# Patient Record
Sex: MALE | Race: OTHER | HISPANIC OR LATINO
[De-identification: names, ages, dates, MRNs, and addresses within clinical notes are randomized per-mention and may not be internally consistent; named-entity substitution may affect disease eponyms.]

---

## 2019-06-17 ENCOUNTER — RESULT REVIEW (OUTPATIENT)
Age: 49
End: 2019-06-17

## 2019-07-05 PROBLEM — Z00.00 ENCOUNTER FOR PREVENTIVE HEALTH EXAMINATION: Status: ACTIVE | Noted: 2019-07-05

## 2019-07-24 ENCOUNTER — APPOINTMENT (OUTPATIENT)
Dept: GASTROENTEROLOGY | Facility: CLINIC | Age: 49
End: 2019-07-24
Payer: COMMERCIAL

## 2019-07-24 VITALS
HEIGHT: 66 IN | SYSTOLIC BLOOD PRESSURE: 90 MMHG | DIASTOLIC BLOOD PRESSURE: 60 MMHG | WEIGHT: 120 LBS | BODY MASS INDEX: 19.29 KG/M2 | HEART RATE: 108 BPM

## 2019-07-24 DIAGNOSIS — F10.11 ALCOHOL ABUSE, IN REMISSION: ICD-10-CM

## 2019-07-24 DIAGNOSIS — M72.6 NECROTIZING FASCIITIS: ICD-10-CM

## 2019-07-24 PROCEDURE — 99214 OFFICE O/P EST MOD 30 MIN: CPT

## 2019-07-24 RX ORDER — LACTULOSE 10 G/15 ML
10 SOLUTION, ORAL ORAL
Refills: 0 | Status: ACTIVE | COMMUNITY

## 2019-07-24 RX ORDER — THIAMINE HCL 100 MG
100 TABLET ORAL
Refills: 0 | Status: ACTIVE | COMMUNITY

## 2019-07-24 NOTE — ASSESSMENT
[FreeTextEntry1] : 1. ETOH related cirrhosis. Last drink 6/2019. Agrees to abstinence. Will enroll in AA. EGD planned to exclude varices. Office follow up monthly\par \par 2. Gallstones: Expectant management for now

## 2019-07-24 NOTE — HISTORY OF PRESENT ILLNESS
[de-identified] : Patient presents for f/u after admission to [facility] ( 6/2019)  for ETOH related  hepatitis  ( was  on steroids for elevated Maddreys), cholecystitis  s/p  cholecystostomy tube, aspiration pneumonia. Re-admitted 7/16 - 7/23/2019 with  RLE necrotizing fascitis requiring fasciotomy. Cholecystostomy tube (  which had become dislodged ) was removed this past admission. Discharged on Xifaxan  / lactulose / pantoprazole / Aldactone. TB peaked at ~ 16 during initial admission. On 7/21/19 :  TB=5.3 with normal AST / ALT. Alk phos = 165

## 2019-07-24 NOTE — PHYSICAL EXAM
[General Appearance - Alert] : alert [General Appearance - In No Acute Distress] : in no acute distress [Neck Appearance] : the appearance of the neck was normal [Apical Impulse] : the apical impulse was normal [] : no respiratory distress [Abdomen Soft] : soft [Abnormal Walk] : normal gait [FreeTextEntry1] : jaundiced [Skin Color & Pigmentation] : normal skin color and pigmentation [No Focal Deficits] : no focal deficits [Oriented To Time, Place, And Person] : oriented to person, place, and time

## 2019-08-18 ENCOUNTER — RESULT REVIEW (OUTPATIENT)
Age: 49
End: 2019-08-18

## 2019-08-19 ENCOUNTER — RX CHANGE (OUTPATIENT)
Age: 49
End: 2019-08-19

## 2019-08-19 ENCOUNTER — APPOINTMENT (OUTPATIENT)
Dept: GASTROENTEROLOGY | Facility: HOSPITAL | Age: 49
End: 2019-08-19

## 2019-08-19 RX ORDER — PANTOPRAZOLE 40 MG/1
40 TABLET, DELAYED RELEASE ORAL
Qty: 30 | Refills: 3 | Status: ACTIVE | COMMUNITY
Start: 2019-08-19 | End: 1900-01-01

## 2019-08-19 RX ORDER — PROPRANOLOL HYDROCHLORIDE 10 MG/1
10 TABLET ORAL 3 TIMES DAILY
Qty: 90 | Refills: 6 | Status: ACTIVE | COMMUNITY
Start: 2019-08-19 | End: 1900-01-01

## 2019-08-28 ENCOUNTER — APPOINTMENT (OUTPATIENT)
Dept: GASTROENTEROLOGY | Facility: CLINIC | Age: 49
End: 2019-08-28
Payer: COMMERCIAL

## 2019-08-28 VITALS
HEART RATE: 68 BPM | WEIGHT: 128 LBS | DIASTOLIC BLOOD PRESSURE: 58 MMHG | SYSTOLIC BLOOD PRESSURE: 90 MMHG | HEIGHT: 66 IN | BODY MASS INDEX: 20.57 KG/M2

## 2019-08-28 PROCEDURE — 99214 OFFICE O/P EST MOD 30 MIN: CPT | Mod: 25

## 2019-08-28 PROCEDURE — 36415 COLL VENOUS BLD VENIPUNCTURE: CPT

## 2019-08-28 NOTE — PHYSICAL EXAM
[General Appearance - Alert] : alert [General Appearance - In No Acute Distress] : in no acute distress [Neck Appearance] : the appearance of the neck was normal [] : no respiratory distress [Apical Impulse] : the apical impulse was normal [Abdomen Soft] : soft [Abnormal Walk] : normal gait [Skin Color & Pigmentation] : normal skin color and pigmentation [No Focal Deficits] : no focal deficits [Oriented To Time, Place, And Person] : oriented to person, place, and time [FreeTextEntry1] : jaundiced

## 2019-08-28 NOTE — HISTORY OF PRESENT ILLNESS
[de-identified] : Patient presents for f/u after EGD earlier this month for h/o cirrhosis. EGD notable for +1 esophageal varices and gastric antral ulcer with overlying clot. Propranolol initiated /. pantoprazole continued.  Remain ETOH abstinent and attends AA meetings. Admitted  to [facility] ( 6/2019)  for ETOH related  hepatitis ( was  on steroids for elevated Maddreys), cholecystitis  s/p  cholecystostomy tube, aspiration pneumonia. Re-admitted 7/16 - 7/23/2019 with  RLE necrotizing fascitis requiring fasciotomy. Cholecystostomy tube (  which had become dislodged ) was removed this past admission. Discharged on Xifaxan  / lactulose / pantoprazole / Aldactone. TB peaked at ~ 16 during initial admission. On 7/21/19 :  TB=5.3 with normal AST / ALT. Alk phos = 165

## 2019-08-28 NOTE — CONSULT LETTER
[Dear  ___] : Dear  [unfilled], [Courtesy Letter:] : I had the pleasure of seeing your patient, [unfilled], in my office today. [Please see my note below.] : Please see my note below. [Consult Closing:] : Thank you very much for allowing me to participate in the care of this patient.  If you have any questions, please do not hesitate to contact me. [Sincerely,] : Sincerely, [FreeTextEntry3] : Agapito David MD\par tel: [phone]\par fax: [phone]\par

## 2019-08-28 NOTE — ASSESSMENT
[FreeTextEntry1] : 1. ETOH related cirrhosis. Last drink 6/2019. Agrees to abstinence. Enroll in AA. EGD with +1 varices / ulcer . Continue propranolol / pantoprazole. Labs drawn today\par \par 2. Gallstones: Expectant management for now\par \par \par Office follow up in  3 months

## 2019-08-29 LAB
ALBUMIN SERPL ELPH-MCNC: 2.9 G/DL
ALP BLD-CCNC: 163 U/L
ALT SERPL-CCNC: 25 U/L
ANION GAP SERPL CALC-SCNC: 11 MMOL/L
AST SERPL-CCNC: 54 U/L
BASOPHILS # BLD AUTO: 0.07 K/UL
BASOPHILS NFR BLD AUTO: 1.2 %
BILIRUB DIRECT SERPL-MCNC: 0.8 MG/DL
BILIRUB INDIRECT SERPL-MCNC: 0.6 MG/DL
BILIRUB SERPL-MCNC: 1.4 MG/DL
BUN SERPL-MCNC: 8 MG/DL
CALCIUM SERPL-MCNC: 8.9 MG/DL
CHLORIDE SERPL-SCNC: 106 MMOL/L
CO2 SERPL-SCNC: 22 MMOL/L
CREAT SERPL-MCNC: 0.55 MG/DL
EOSINOPHIL # BLD AUTO: 0.31 K/UL
EOSINOPHIL NFR BLD AUTO: 5.3 %
FOLATE SERPL-MCNC: >20 NG/ML
GLUCOSE SERPL-MCNC: 100 MG/DL
HCT VFR BLD CALC: 31.5 %
HGB BLD-MCNC: 10 G/DL
IMM GRANULOCYTES NFR BLD AUTO: 0.2 %
INR PPP: 1.19 RATIO
LYMPHOCYTES # BLD AUTO: 2.63 K/UL
LYMPHOCYTES NFR BLD AUTO: 44.9 %
MAN DIFF?: NORMAL
MCHC RBC-ENTMCNC: 31.7 GM/DL
MCHC RBC-ENTMCNC: 31.8 PG
MCV RBC AUTO: 100.3 FL
MONOCYTES # BLD AUTO: 0.61 K/UL
MONOCYTES NFR BLD AUTO: 10.4 %
NEUTROPHILS # BLD AUTO: 2.23 K/UL
NEUTROPHILS NFR BLD AUTO: 38 %
PLATELET # BLD AUTO: 117 K/UL
POTASSIUM SERPL-SCNC: 4.2 MMOL/L
PROT SERPL-MCNC: 6 G/DL
PT BLD: 13.8 SEC
RBC # BLD: 3.14 M/UL
RBC # FLD: 14.3 %
SODIUM SERPL-SCNC: 139 MMOL/L
VIT B12 SERPL-MCNC: 809 PG/ML
WBC # FLD AUTO: 5.86 K/UL

## 2019-11-04 ENCOUNTER — APPOINTMENT (OUTPATIENT)
Dept: GASTROENTEROLOGY | Facility: CLINIC | Age: 49
End: 2019-11-04
Payer: COMMERCIAL

## 2019-11-04 VITALS
HEART RATE: 70 BPM | SYSTOLIC BLOOD PRESSURE: 100 MMHG | HEIGHT: 66 IN | WEIGHT: 138 LBS | DIASTOLIC BLOOD PRESSURE: 64 MMHG | BODY MASS INDEX: 22.18 KG/M2

## 2019-11-04 PROCEDURE — 99214 OFFICE O/P EST MOD 30 MIN: CPT

## 2019-11-04 NOTE — ASSESSMENT
[FreeTextEntry1] : 1. ETOH related cirrhosis. Last drink 6/2019. Agrees to abstinence. Enrolled in AA. EGD with +1 varices / ulcer . Continue propranolol / pantoprazole. MELD = 8...does not need referral for liver transplant evaluation at present . Breast pain likely due to spironolactone.  Decrease dose to 25 mg daily...if persistent pain in  2-4 weeks, may d/c spironolactone\par \par 2. Gallstones: Expectant management for now\par \par \par Office follow up in  3 months

## 2019-11-04 NOTE — CONSULT LETTER
[Dear  ___] : Dear  [unfilled], [Courtesy Letter:] : I had the pleasure of seeing your patient, [unfilled], in my office today. [Please see my note below.] : Please see my note below. [Consult Closing:] : Thank you very much for allowing me to participate in the care of this patient.  If you have any questions, please do not hesitate to contact me. [Sincerely,] : Sincerely, [FreeTextEntry3] : Agapito David MD\par tel: [phone]\par fax: [phone]\par

## 2019-11-04 NOTE — HISTORY OF PRESENT ILLNESS
[de-identified] : Patient presents for f/u.  Continues with abstinence and AA meetings. Only complaint is breast pain.  Labs from 10/1/2019 (reviewed by me )   were notable for normal INR / creatinine = 0.62 / AST = 45 / ALT was normal / HGB= 11 / TB = 0.9 ( MELD = 8).   EGD 8/2019 for h/o cirrhosis notable for +1 esophageal varices and gastric antral ulcer with overlying clot. Propranolol  continued. At last visit had remained ETOH abstinent and was attending   AA meetings. Admitted  to [facility] ( 6/2019)  for ETOH related  hepatitis ( was  on steroids for elevated Maddreys), cholecystitis  s/p  cholecystostomy tube, aspiration pneumonia. Re-admitted 7/16 - 7/23/2019 with  RLE necrotizing fascitis requiring fasciotomy. Cholecystostomy tube (  which had become dislodged ) was removed this past admission. Discharged on Xifaxan  / lactulose / pantoprazole / Aldactone. TB peaked at ~ 16 during initial admission. On 7/21/19 :  TB=5.3 with normal AST / ALT. Alk phos = 165

## 2020-02-03 ENCOUNTER — APPOINTMENT (OUTPATIENT)
Dept: GASTROENTEROLOGY | Facility: CLINIC | Age: 50
End: 2020-02-03
Payer: COMMERCIAL

## 2020-02-03 VITALS
DIASTOLIC BLOOD PRESSURE: 68 MMHG | WEIGHT: 152 LBS | HEART RATE: 61 BPM | BODY MASS INDEX: 24.43 KG/M2 | SYSTOLIC BLOOD PRESSURE: 100 MMHG | HEIGHT: 66 IN

## 2020-02-03 PROCEDURE — 99215 OFFICE O/P EST HI 40 MIN: CPT

## 2020-02-03 NOTE — ASSESSMENT
[FreeTextEntry1] : 1. ETOH related cirrhosis. Last drink 6/2019. Agrees to abstinence. Enrolled in AA. EGD with +1 varices / ulcer . Continue propranolol / pantoprazole. MELD = 8...does not need referral for liver transplant evaluation at present . Breast pain likely due to spironolactone...D/C Aldactone  \par \par 2. Gallstones: Expectant management for now\par \par 3. Esophageal Varices:  Continue propranolol\par \par 4. H/O Hepatic encephalopathy:  Continue Lactulose\par \par 5. Colon cancer screening:  Colonoscopy scheduled\par \par \par Office follow up in  3 months

## 2020-02-03 NOTE — CONSULT LETTER
[Dear  ___] : Dear  [unfilled], [Courtesy Letter:] : I had the pleasure of seeing your patient, [unfilled], in my office today. [Please see my note below.] : Please see my note below. [Consult Closing:] : Thank you very much for allowing me to participate in the care of this patient.  If you have any questions, please do not hesitate to contact me. [Sincerely,] : Sincerely, [FreeTextEntry3] : Agapito David MD\par tel: [phone]\par fax: [phone]\par

## 2020-02-03 NOTE — HISTORY OF PRESENT ILLNESS
[de-identified] : Patient presents for f/u.  Labs from [facility] ( 11/2019 reviewed) . Bun =10 / Cr =.74 / Na = 138 / TB=0.6 / AST = 36 / ALT = 23  / PLTS = 148 / INR = 1.05. Continues with abstinence and AA meetings. Still with c/o breast pain despite decreased dose of Aldactone.   Labs from 10/1/2019 (reviewed by me )   were notable for normal INR / creatinine = 0.62 / AST = 45 / ALT was normal / HGB= 11 / TB = 0.9 ( MELD = 8).   EGD 8/2019 for h/o cirrhosis notable for +1 esophageal varices and gastric antral ulcer with overlying clot. Propranolol  continued. At last visit had remained ETOH abstinent and was attending   AA meetings. Admitted  to [facility] ( 6/2019)  for ETOH related  hepatitis ( was  on steroids for elevated Maddreys), cholecystitis  s/p  cholecystostomy tube, aspiration pneumonia. Re-admitted 7/16 - 7/23/2019 with  RLE necrotizing fascitis requiring fasciotomy. Cholecystostomy tube (  which had become dislodged ) was removed this past admission. Discharged on Xifaxan  / lactulose / pantoprazole / Aldactone. TB peaked at ~ 16 during initial admission. On 7/21/19 :  TB=5.3 with normal AST / ALT. Alk phos = 165

## 2020-03-17 ENCOUNTER — APPOINTMENT (OUTPATIENT)
Dept: GASTROENTEROLOGY | Facility: HOSPITAL | Age: 50
End: 2020-03-17

## 2020-08-10 ENCOUNTER — APPOINTMENT (OUTPATIENT)
Dept: GASTROENTEROLOGY | Facility: CLINIC | Age: 50
End: 2020-08-10
Payer: COMMERCIAL

## 2020-08-10 VITALS
WEIGHT: 170 LBS | HEART RATE: 66 BPM | SYSTOLIC BLOOD PRESSURE: 96 MMHG | TEMPERATURE: 98.5 F | BODY MASS INDEX: 27.32 KG/M2 | HEIGHT: 66 IN | DIASTOLIC BLOOD PRESSURE: 58 MMHG

## 2020-08-10 PROCEDURE — 99214 OFFICE O/P EST MOD 30 MIN: CPT | Mod: 25

## 2020-08-10 PROCEDURE — 36415 COLL VENOUS BLD VENIPUNCTURE: CPT

## 2020-08-10 RX ORDER — CHLORHEXIDINE GLUCONATE 4 %
400 LIQUID (ML) TOPICAL
Refills: 0 | Status: DISCONTINUED | COMMUNITY
End: 2020-08-10

## 2020-08-10 RX ORDER — NUT.TX.IMPAIRED DIGESTIVE FXN 0.035-1/ML
LIQUID (ML) ORAL
Refills: 0 | Status: DISCONTINUED | COMMUNITY
End: 2020-08-10

## 2020-08-10 RX ORDER — LEVOFLOXACIN 250 MG/1
250 TABLET, FILM COATED ORAL
Refills: 0 | Status: DISCONTINUED | COMMUNITY
End: 2020-08-10

## 2020-08-10 RX ORDER — SPIRONOLACTONE 25 MG/1
25 TABLET, FILM COATED ORAL
Refills: 0 | Status: DISCONTINUED | COMMUNITY
End: 2020-08-10

## 2020-08-10 RX ORDER — OMEGA-3/DHA/EPA/FISH OIL 300-1000MG
400 CAPSULE ORAL
Refills: 0 | Status: DISCONTINUED | COMMUNITY
End: 2020-08-10

## 2020-08-10 RX ORDER — FOLIC ACID 1 MG
1 TABLET ORAL
Refills: 0 | Status: DISCONTINUED | COMMUNITY
End: 2020-08-10

## 2020-08-10 NOTE — PHYSICAL EXAM
[General Appearance - Alert] : alert [Neck Appearance] : the appearance of the neck was normal [General Appearance - In No Acute Distress] : in no acute distress [Abdomen Soft] : soft [Apical Impulse] : the apical impulse was normal [] : no respiratory distress [Skin Color & Pigmentation] : normal skin color and pigmentation [Abnormal Walk] : normal gait [No Focal Deficits] : no focal deficits [Oriented To Time, Place, And Person] : oriented to person, place, and time [FreeTextEntry1] : jaundiced

## 2020-08-10 NOTE — HISTORY OF PRESENT ILLNESS
[de-identified] : Patient presents for f/u.  Last seen in office 2/2020. Colonoscopy 3/2020 : hemorrhoids and diverticulosis. Labs from [facility] ( 11/2019 reviewed) . Bun =10 / Cr =.74 / Na = 138 / TB=0.6 / AST = 36 / ALT = 23  / PLTS = 148 / INR = 1.05. Continues with abstinence and AA meetings. Still with c/o breast pain despite decreased dose of Aldactone.   Labs from 10/1/2019 (reviewed by me )   were notable for normal INR / creatinine = 0.62 / AST = 45 / ALT was normal / HGB= 11 / TB = 0.9 ( MELD = 8).   EGD 8/2019 for h/o cirrhosis notable for +1 esophageal varices and gastric antral ulcer with overlying clot. Propranolol  continued. At last visit had remained ETOH abstinent and was attending   AA meetings. Admitted  to [facility] ( 6/2019)  for ETOH related  hepatitis ( was  on steroids for elevated Maddreys), cholecystitis  s/p  cholecystostomy tube, aspiration pneumonia. Re-admitted 7/16 - 7/23/2019 with  RLE necrotizing fascitis requiring fasciotomy. Cholecystostomy tube (  which had become dislodged ) was removed this past admission. Discharged on Xifaxan  / lactulose / pantoprazole / Aldactone. TB peaked at ~ 16 during initial admission. On 7/21/19 :  TB=5.3 with normal AST / ALT. Alk phos = 165

## 2020-08-10 NOTE — ASSESSMENT
[FreeTextEntry1] : 1. ETOH related cirrhosis. Last drink 6/2019. Agrees to abstinence. Enrolled in AA. EGD with +1 varices / ulcer . Continue propranolol / pantoprazole. Last MELD = 8 ...does not need referral for liver transplant evaluation at present .  Aldactone  discontinued secondary to breast pain. Consider  d/c lactulose  at next visit. Sonogram next visit\par \par 2. Gallstones: Expectant management for now\par \par 3. Esophageal Varices:  Continue propranolol\par \par 4. H/O Hepatic encephalopathy:  Continue Lactulose\par \par 5. Colon cancer screening:  Colonoscopy normal 3/2020\par \par \par Office follow up in  3 months

## 2021-02-10 ENCOUNTER — APPOINTMENT (OUTPATIENT)
Dept: GASTROENTEROLOGY | Facility: CLINIC | Age: 51
End: 2021-02-10
Payer: COMMERCIAL

## 2021-02-10 VITALS
BODY MASS INDEX: 28.12 KG/M2 | WEIGHT: 175 LBS | SYSTOLIC BLOOD PRESSURE: 100 MMHG | OXYGEN SATURATION: 96 % | DIASTOLIC BLOOD PRESSURE: 70 MMHG | HEART RATE: 56 BPM | TEMPERATURE: 96.7 F | HEIGHT: 66 IN

## 2021-02-10 DIAGNOSIS — K80.20 CALCULUS OF GALLBLADDER W/OUT CHOLECYSTITIS W/OUT OBSTRUCTION: ICD-10-CM

## 2021-02-10 DIAGNOSIS — K25.9 GASTRIC ULCER, UNSPECIFIED AS ACUTE OR CHRONIC, W/OUT HEMORRHAGE OR PERFORATION: ICD-10-CM

## 2021-02-10 DIAGNOSIS — Z12.11 ENCOUNTER FOR SCREENING FOR MALIGNANT NEOPLASM OF COLON: ICD-10-CM

## 2021-02-10 PROCEDURE — 36415 COLL VENOUS BLD VENIPUNCTURE: CPT

## 2021-02-10 PROCEDURE — 99072 ADDL SUPL MATRL&STAF TM PHE: CPT

## 2021-02-10 PROCEDURE — 99214 OFFICE O/P EST MOD 30 MIN: CPT | Mod: 25

## 2021-02-10 NOTE — PHYSICAL EXAM
[General Appearance - Alert] : alert [General Appearance - In No Acute Distress] : in no acute distress [] : no respiratory distress [Neck Appearance] : the appearance of the neck was normal [Apical Impulse] : the apical impulse was normal [Abdomen Soft] : soft [Skin Color & Pigmentation] : normal skin color and pigmentation [Abnormal Walk] : normal gait [No Focal Deficits] : no focal deficits [Oriented To Time, Place, And Person] : oriented to person, place, and time [FreeTextEntry1] : jaundiced

## 2021-02-10 NOTE — ASSESSMENT
[FreeTextEntry1] : 1. ETOH related cirrhosis. Last drink 6/2019. Agrees to abstinence. Enrolled in AA. EGD with +1 varices / ulcer . Continue propranolol / pantoprazole. Last MELD = 8 ...does not need referral for liver transplant evaluation at present .  Aldactone  discontinued secondary to breast pain.  Sonogram ordered. Labs ordered\par \par 2. Gallstones: Expectant management for now\par \par 3. Esophageal Varices:  Continue propranolol\par \par 4. H/O Hepatic encephalopathy:  Continue Lactulose\par \par 5. Colon cancer screening:  Colonoscopy normal in 3/2020\par \par \par Office follow up in  6 months \par \par The ordered labs/ bloods  were drawn / collected in my office\par

## 2021-02-10 NOTE — HISTORY OF PRESENT ILLNESS
[de-identified] : Patient presents for f/u.  Remains  abstinent / no new complaints.\par Last seen in office 8/2020 at which point he was ETOH abstinent  / enrolled in AA. \par Colonoscopy 3/2020 : hemorrhoids and diverticulosis.\par Labs from [facility] ( 11/2019 reviewed) . Bun =10 / Cr =.74 / Na = 138 / TB=0.6 / AST = 36 / ALT = 23  / PLTS = 148 / INR = 1.05. Continues with abstinence and AA meetings. Still with c/o breast pain despite decreased dose of Aldactone.   Labs from 10/1/2019 (reviewed by me )   were notable for normal INR / creatinine = 0.62 / AST = 45 / ALT was normal / HGB= 11 / TB = 0.9 ( MELD = 8).   EGD 8/2019 for h/o cirrhosis notable for +1 esophageal varices and gastric antral ulcer with overlying clot. Propranolol  continued. At last visit had remained ETOH abstinent and was attending   AA meetings. Admitted  to [facility] ( 6/2019)  for ETOH related  hepatitis ( was  on steroids for elevated Maddreys), cholecystitis  s/p  cholecystostomy tube, aspiration pneumonia. Re-admitted 7/16 - 7/23/2019 with  RLE necrotizing fascitis requiring fasciotomy. Cholecystostomy tube (  which had become dislodged ) was removed this past admission. Discharged on Xifaxan  / lactulose / pantoprazole / Aldactone. TB peaked at ~ 16 during initial admission. On 7/21/19 :  TB=5.3 with normal AST / ALT. Alk phos = 165

## 2021-02-16 ENCOUNTER — RESULT REVIEW (OUTPATIENT)
Age: 51
End: 2021-02-16

## 2021-08-11 ENCOUNTER — APPOINTMENT (OUTPATIENT)
Dept: GASTROENTEROLOGY | Facility: CLINIC | Age: 51
End: 2021-08-11
Payer: COMMERCIAL

## 2021-08-11 VITALS
TEMPERATURE: 98.7 F | BODY MASS INDEX: 28.12 KG/M2 | SYSTOLIC BLOOD PRESSURE: 100 MMHG | OXYGEN SATURATION: 97 % | WEIGHT: 175 LBS | HEIGHT: 66 IN | HEART RATE: 62 BPM | DIASTOLIC BLOOD PRESSURE: 60 MMHG

## 2021-08-11 PROCEDURE — 99214 OFFICE O/P EST MOD 30 MIN: CPT

## 2021-08-11 PROCEDURE — 99072 ADDL SUPL MATRL&STAF TM PHE: CPT

## 2021-08-11 NOTE — HISTORY OF PRESENT ILLNESS
[de-identified] : Patient presents for f/u.  Remains  abstinent / no new complaints. \par \par Labs  from 3/2021 were reviewed and  were notable  for normal LFTS / normal CBSLast evaluated 2/2021 and  8/2020 at which point he was ETOH abstinent  / enrolled in AA. \par \par Colonoscopy 3/2020 : hemorrhoids and diverticulosis.\par \par Labs from [facility] ( 11/2019 reviewed) . Bun =10 / Cr =.74 / Na = 138 / TB=0.6 / AST = 36 / ALT = 23  / PLTS = 148 / INR = 1.05. Continues with abstinence and AA meetings.  Labs from 10/1/2019 (reviewed by me )   were notable for normal INR / creatinine = 0.62 / AST = 45 / ALT was normal / HGB= 11 / TB = 0.9 ( MELD = 8).  \par \par - EGD 8/2019 for h/o cirrhosis notable for +1 esophageal varices and gastric antral ulcer with overlying clot. Propranolol  continued. \par \par Admitted  to [facility] ( 6/2019)  for ETOH related  hepatitis ( was  on steroids for elevated Maddreys), cholecystitis  s/p  cholecystostomy tube, aspiration pneumonia. Re-admitted 7/16 - 7/23/2019 with  RLE necrotizing fascitis requiring fasciotomy. Discharged on Xifaxan  / lactulose / pantoprazole / Aldactone. TB peaked at ~ 16 during initial admission. On 7/21/19 :  TB=5.3 with normal AST / ALT. Alk phos = 165

## 2021-08-11 NOTE — ASSESSMENT
[FreeTextEntry1] : 1. ETOH related cirrhosis. Last drink 6/2019. Agrees to abstinence. Enrolled in AA. EGD with +1 varices / ulcer . Continue propranolol / pantoprazole. Last MELD = 8 ...does not need referral for liver transplant evaluation at present .  Aldactone  discontinued secondary to breast pain.  Follow up in 1  year for Labs / sonogram. \par \par 2. Gallstones: Expectant management for now\par \par 3. Esophageal Varices:  Continue propranolol\par \par 4. Colon cancer screening:  Colonoscopy normal in 3/2020\par \par \par Office follow up in  1 year \par D/C  lactulose\par \par

## 2022-09-12 ENCOUNTER — APPOINTMENT (OUTPATIENT)
Dept: GASTROENTEROLOGY | Facility: CLINIC | Age: 52
End: 2022-09-12

## 2022-09-12 VITALS
WEIGHT: 175 LBS | OXYGEN SATURATION: 97 % | DIASTOLIC BLOOD PRESSURE: 67 MMHG | HEART RATE: 64 BPM | SYSTOLIC BLOOD PRESSURE: 110 MMHG | TEMPERATURE: 98.9 F | HEIGHT: 66 IN | BODY MASS INDEX: 28.12 KG/M2

## 2022-09-12 DIAGNOSIS — K70.30 ALCOHOLIC CIRRHOSIS OF LIVER W/OUT ASCITES: ICD-10-CM

## 2022-09-12 DIAGNOSIS — I85.00 ESOPHAGEAL VARICES W/OUT BLEEDING: ICD-10-CM

## 2022-09-12 PROCEDURE — 36415 COLL VENOUS BLD VENIPUNCTURE: CPT

## 2022-09-12 PROCEDURE — 99214 OFFICE O/P EST MOD 30 MIN: CPT | Mod: 25

## 2022-09-12 NOTE — HISTORY OF PRESENT ILLNESS
[de-identified] : Patient presents for f/u.  Remains  abstinent / no new complaints. \par \par Labs  from 3/2021 were reviewed and  were notable  for normal LFTS / normal CBSLast evaluated 2/2021 and  8/2020 at which point he was ETOH abstinent  / enrolled in AA. \par \par Colonoscopy 3/2020 : hemorrhoids and diverticulosis.\par \par Labs from [facility] ( 11/2019 reviewed) . Bun =10 / Cr =.74 / Na = 138 / TB=0.6 / AST = 36 / ALT = 23  / PLTS = 148 / INR = 1.05. Continues with abstinence and AA meetings.  Labs from 10/1/2019 (reviewed by me )   were notable for normal INR / creatinine = 0.62 / AST = 45 / ALT was normal / HGB= 11 / TB = 0.9 ( MELD = 8).  \par \par - EGD 8/2019 for h/o cirrhosis notable for +1 esophageal varices and gastric antral ulcer with overlying clot. Propranolol  continued. \par \par Admitted  to [facility] ( 6/2019)  for ETOH related  hepatitis ( was  on steroids for elevated Maddreys), cholecystitis  s/p  cholecystostomy tube, aspiration pneumonia. Re-admitted 7/16 - 7/23/2019 with  RLE necrotizing fascitis requiring fasciotomy. Discharged on Xifaxan  / lactulose / pantoprazole / Aldactone. TB peaked at ~ 16 during initial admission. On 7/21/19 :  TB=5.3 with normal AST / ALT. Alk phos = 165

## 2022-09-12 NOTE — ASSESSMENT
[FreeTextEntry1] : 1. ETOH related cirrhosis. Last drink 6/2019. Agrees to abstinence. Enrolled in AA. EGD with +1 varices / ulcer . Continue propranolol / pantoprazole. Last MELD = 8 ...does not need referral for liver transplant evaluation at present .  Aldactone  discontinued secondary to breast pain.  Follow up in 1  year for Labs / sonogram. Labs  and  CT scan scheduled today\par \par 2. Gallstones: Expectant management for now\par \par 3. Esophageal Varices:  Continue propranolol\par \par 4. Colon cancer screening:  Colonoscopy normal in 3/2020\par \par \par Office follow up in  1 year \par Pertinent available records reviewed\par The ordered labs/ bloods  were drawn / collected in my office\par \par \par

## 2022-09-13 LAB
AFP-TM SERPL-MCNC: 4.7 NG/ML
ALBUMIN SERPL ELPH-MCNC: 4.5 G/DL
ALP BLD-CCNC: 112 U/L
ALT SERPL-CCNC: 30 U/L
ANION GAP SERPL CALC-SCNC: 13 MMOL/L
AST SERPL-CCNC: 29 U/L
BASOPHILS # BLD AUTO: 0.06 K/UL
BASOPHILS NFR BLD AUTO: 1 %
BILIRUB DIRECT SERPL-MCNC: 0.1 MG/DL
BILIRUB INDIRECT SERPL-MCNC: 0.2 MG/DL
BILIRUB SERPL-MCNC: 0.4 MG/DL
BUN SERPL-MCNC: 9 MG/DL
CALCIUM SERPL-MCNC: 9.5 MG/DL
CHLORIDE SERPL-SCNC: 105 MMOL/L
CO2 SERPL-SCNC: 22 MMOL/L
CREAT SERPL-MCNC: 0.85 MG/DL
EGFR: 105 ML/MIN/1.73M2
EOSINOPHIL # BLD AUTO: 0.18 K/UL
EOSINOPHIL NFR BLD AUTO: 3 %
FOLATE SERPL-MCNC: >20 NG/ML
GLUCOSE SERPL-MCNC: 103 MG/DL
HCT VFR BLD CALC: 43.7 %
HGB BLD-MCNC: 14.6 G/DL
IMM GRANULOCYTES NFR BLD AUTO: 0.2 %
LYMPHOCYTES # BLD AUTO: 2.93 K/UL
LYMPHOCYTES NFR BLD AUTO: 49.1 %
MAN DIFF?: NORMAL
MCHC RBC-ENTMCNC: 29.9 PG
MCHC RBC-ENTMCNC: 33.4 GM/DL
MCV RBC AUTO: 89.5 FL
MONOCYTES # BLD AUTO: 0.39 K/UL
MONOCYTES NFR BLD AUTO: 6.5 %
NEUTROPHILS # BLD AUTO: 2.4 K/UL
NEUTROPHILS NFR BLD AUTO: 40.2 %
PLATELET # BLD AUTO: 169 K/UL
POTASSIUM SERPL-SCNC: 4 MMOL/L
PROT SERPL-MCNC: 7.5 G/DL
RBC # BLD: 4.88 M/UL
RBC # FLD: 13.4 %
SODIUM SERPL-SCNC: 141 MMOL/L
TSH SERPL-ACNC: 1.37 UIU/ML
VIT B12 SERPL-MCNC: 872 PG/ML
WBC # FLD AUTO: 5.97 K/UL

## 2022-12-13 ENCOUNTER — RESULT REVIEW (OUTPATIENT)
Age: 52
End: 2022-12-13